# Patient Record
Sex: FEMALE | Race: WHITE | NOT HISPANIC OR LATINO | Employment: STUDENT | URBAN - METROPOLITAN AREA
[De-identification: names, ages, dates, MRNs, and addresses within clinical notes are randomized per-mention and may not be internally consistent; named-entity substitution may affect disease eponyms.]

---

## 2024-06-18 ENCOUNTER — HOSPITAL ENCOUNTER (EMERGENCY)
Facility: CLINIC | Age: 24
Discharge: HOME | End: 2024-06-18
Attending: EMERGENCY MEDICINE | Admitting: EMERGENCY MEDICINE
Payer: COMMERCIAL

## 2024-06-18 ENCOUNTER — APPOINTMENT (OUTPATIENT)
Dept: CT IMAGING | Facility: CLINIC | Age: 24
End: 2024-06-18
Attending: EMERGENCY MEDICINE
Payer: COMMERCIAL

## 2024-06-18 VITALS
TEMPERATURE: 97.7 F | WEIGHT: 150 LBS | SYSTOLIC BLOOD PRESSURE: 126 MMHG | HEIGHT: 64 IN | HEART RATE: 89 BPM | BODY MASS INDEX: 25.61 KG/M2 | DIASTOLIC BLOOD PRESSURE: 90 MMHG | RESPIRATION RATE: 16 BRPM | OXYGEN SATURATION: 99 %

## 2024-06-18 DIAGNOSIS — T81.40XA POSTOPERATIVE INFECTION, UNSPECIFIED TYPE, INITIAL ENCOUNTER: ICD-10-CM

## 2024-06-18 DIAGNOSIS — R22.0 JAW SWELLING: ICD-10-CM

## 2024-06-18 LAB
ANION GAP SERPL CALCULATED.3IONS-SCNC: 9 MMOL/L (ref 7–15)
BASOPHILS # BLD AUTO: 0 10E3/UL (ref 0–0.2)
BASOPHILS NFR BLD AUTO: 0 %
BUN SERPL-MCNC: 10.2 MG/DL (ref 6–20)
CALCIUM SERPL-MCNC: 8.4 MG/DL (ref 8.6–10)
CHLORIDE SERPL-SCNC: 105 MMOL/L (ref 98–107)
CREAT SERPL-MCNC: 0.96 MG/DL (ref 0.51–0.95)
CRP SERPL-MCNC: 27.81 MG/L
DEPRECATED HCO3 PLAS-SCNC: 24 MMOL/L (ref 22–29)
EGFRCR SERPLBLD CKD-EPI 2021: 86 ML/MIN/1.73M2
EOSINOPHIL # BLD AUTO: 0.1 10E3/UL (ref 0–0.7)
EOSINOPHIL NFR BLD AUTO: 1 %
ERYTHROCYTE [DISTWIDTH] IN BLOOD BY AUTOMATED COUNT: 13.1 % (ref 10–15)
GLUCOSE SERPL-MCNC: 159 MG/DL (ref 70–99)
HCG SERPL QL: NEGATIVE
HCT VFR BLD AUTO: 36.3 % (ref 35–47)
HGB BLD-MCNC: 11.9 G/DL (ref 11.7–15.7)
IMM GRANULOCYTES # BLD: 0.1 10E3/UL
IMM GRANULOCYTES NFR BLD: 1 %
LACTATE SERPL-SCNC: 1.1 MMOL/L (ref 0.7–2)
LYMPHOCYTES # BLD AUTO: 2.3 10E3/UL (ref 0.8–5.3)
LYMPHOCYTES NFR BLD AUTO: 12 %
MCH RBC QN AUTO: 28.1 PG (ref 26.5–33)
MCHC RBC AUTO-ENTMCNC: 32.8 G/DL (ref 31.5–36.5)
MCV RBC AUTO: 86 FL (ref 78–100)
MONOCYTES # BLD AUTO: 1.3 10E3/UL (ref 0–1.3)
MONOCYTES NFR BLD AUTO: 7 %
NEUTROPHILS # BLD AUTO: 15 10E3/UL (ref 1.6–8.3)
NEUTROPHILS NFR BLD AUTO: 79 %
NRBC # BLD AUTO: 0 10E3/UL
NRBC BLD AUTO-RTO: 0 /100
PLATELET # BLD AUTO: 355 10E3/UL (ref 150–450)
POTASSIUM SERPL-SCNC: 3.9 MMOL/L (ref 3.4–5.3)
RBC # BLD AUTO: 4.24 10E6/UL (ref 3.8–5.2)
SODIUM SERPL-SCNC: 138 MMOL/L (ref 135–145)
WBC # BLD AUTO: 18.8 10E3/UL (ref 4–11)

## 2024-06-18 PROCEDURE — 36415 COLL VENOUS BLD VENIPUNCTURE: CPT | Performed by: EMERGENCY MEDICINE

## 2024-06-18 PROCEDURE — 86140 C-REACTIVE PROTEIN: CPT | Performed by: EMERGENCY MEDICINE

## 2024-06-18 PROCEDURE — 87040 BLOOD CULTURE FOR BACTERIA: CPT | Performed by: EMERGENCY MEDICINE

## 2024-06-18 PROCEDURE — 80048 BASIC METABOLIC PNL TOTAL CA: CPT | Performed by: EMERGENCY MEDICINE

## 2024-06-18 PROCEDURE — 96361 HYDRATE IV INFUSION ADD-ON: CPT | Performed by: EMERGENCY MEDICINE

## 2024-06-18 PROCEDURE — 41800 DRAINAGE OF GUM LESION: CPT | Performed by: EMERGENCY MEDICINE

## 2024-06-18 PROCEDURE — 96376 TX/PRO/DX INJ SAME DRUG ADON: CPT | Performed by: EMERGENCY MEDICINE

## 2024-06-18 PROCEDURE — 99284 EMERGENCY DEPT VISIT MOD MDM: CPT | Performed by: EMERGENCY MEDICINE

## 2024-06-18 PROCEDURE — 70491 CT SOFT TISSUE NECK W/DYE: CPT

## 2024-06-18 PROCEDURE — 250N000009 HC RX 250: Performed by: EMERGENCY MEDICINE

## 2024-06-18 PROCEDURE — 258N000003 HC RX IP 258 OP 636: Mod: JZ | Performed by: EMERGENCY MEDICINE

## 2024-06-18 PROCEDURE — 250N000011 HC RX IP 250 OP 636: Performed by: EMERGENCY MEDICINE

## 2024-06-18 PROCEDURE — 83605 ASSAY OF LACTIC ACID: CPT | Performed by: EMERGENCY MEDICINE

## 2024-06-18 PROCEDURE — 84703 CHORIONIC GONADOTROPIN ASSAY: CPT | Performed by: EMERGENCY MEDICINE

## 2024-06-18 PROCEDURE — 85025 COMPLETE CBC W/AUTO DIFF WBC: CPT | Performed by: EMERGENCY MEDICINE

## 2024-06-18 PROCEDURE — 99285 EMERGENCY DEPT VISIT HI MDM: CPT | Mod: 25 | Performed by: EMERGENCY MEDICINE

## 2024-06-18 PROCEDURE — 96365 THER/PROPH/DIAG IV INF INIT: CPT | Performed by: EMERGENCY MEDICINE

## 2024-06-18 PROCEDURE — 96375 TX/PRO/DX INJ NEW DRUG ADDON: CPT | Mod: 59 | Performed by: EMERGENCY MEDICINE

## 2024-06-18 PROCEDURE — 250N000011 HC RX IP 250 OP 636: Mod: JZ | Performed by: EMERGENCY MEDICINE

## 2024-06-18 RX ORDER — CHLORHEXIDINE GLUCONATE ORAL RINSE 1.2 MG/ML
15 SOLUTION DENTAL 2 TIMES DAILY
Qty: 473 ML | Refills: 0 | Status: SHIPPED | OUTPATIENT
Start: 2024-06-18

## 2024-06-18 RX ORDER — IOPAMIDOL 755 MG/ML
100 INJECTION, SOLUTION INTRAVASCULAR ONCE
Status: COMPLETED | OUTPATIENT
Start: 2024-06-18 | End: 2024-06-18

## 2024-06-18 RX ORDER — HYDROMORPHONE HYDROCHLORIDE 1 MG/ML
0.5 INJECTION, SOLUTION INTRAMUSCULAR; INTRAVENOUS; SUBCUTANEOUS EVERY 30 MIN PRN
Status: COMPLETED | OUTPATIENT
Start: 2024-06-18 | End: 2024-06-18

## 2024-06-18 RX ORDER — ONDANSETRON 2 MG/ML
4 INJECTION INTRAMUSCULAR; INTRAVENOUS EVERY 30 MIN PRN
Status: DISCONTINUED | OUTPATIENT
Start: 2024-06-18 | End: 2024-06-18 | Stop reason: HOSPADM

## 2024-06-18 RX ORDER — AMOXICILLIN 500 MG/1
500 TABLET, FILM COATED ORAL 3 TIMES DAILY
COMMUNITY

## 2024-06-18 RX ORDER — AMPICILLIN AND SULBACTAM 2; 1 G/1; G/1
3 INJECTION, POWDER, FOR SOLUTION INTRAMUSCULAR; INTRAVENOUS ONCE
Status: COMPLETED | OUTPATIENT
Start: 2024-06-18 | End: 2024-06-18

## 2024-06-18 RX ORDER — OXYCODONE HYDROCHLORIDE 5 MG/1
5 TABLET ORAL EVERY 4 HOURS PRN
Qty: 18 TABLET | Refills: 0 | Status: SHIPPED | OUTPATIENT
Start: 2024-06-18 | End: 2024-06-21

## 2024-06-18 RX ADMIN — AMPICILLIN SODIUM AND SULBACTAM SODIUM 3 G: 2; 1 INJECTION, POWDER, FOR SOLUTION INTRAMUSCULAR; INTRAVENOUS at 03:14

## 2024-06-18 RX ADMIN — SODIUM CHLORIDE 50 ML: 9 INJECTION, SOLUTION INTRAVENOUS at 03:45

## 2024-06-18 RX ADMIN — SODIUM CHLORIDE 2000 ML: 9 INJECTION, SOLUTION INTRAVENOUS at 03:07

## 2024-06-18 RX ADMIN — HYDROMORPHONE HYDROCHLORIDE 0.5 MG: 1 INJECTION, SOLUTION INTRAMUSCULAR; INTRAVENOUS; SUBCUTANEOUS at 04:09

## 2024-06-18 RX ADMIN — HYDROMORPHONE HYDROCHLORIDE 0.5 MG: 1 INJECTION, SOLUTION INTRAMUSCULAR; INTRAVENOUS; SUBCUTANEOUS at 07:43

## 2024-06-18 RX ADMIN — HYDROMORPHONE HYDROCHLORIDE 0.5 MG: 1 INJECTION, SOLUTION INTRAMUSCULAR; INTRAVENOUS; SUBCUTANEOUS at 05:54

## 2024-06-18 RX ADMIN — IOPAMIDOL 90 ML: 755 INJECTION, SOLUTION INTRAVENOUS at 03:45

## 2024-06-18 ASSESSMENT — ACTIVITIES OF DAILY LIVING (ADL)
ADLS_ACUITY_SCORE: 35
ADLS_ACUITY_SCORE: 35
ADLS_ACUITY_SCORE: 33
ADLS_ACUITY_SCORE: 35

## 2024-06-18 ASSESSMENT — COLUMBIA-SUICIDE SEVERITY RATING SCALE - C-SSRS
6. HAVE YOU EVER DONE ANYTHING, STARTED TO DO ANYTHING, OR PREPARED TO DO ANYTHING TO END YOUR LIFE?: NO
1. IN THE PAST MONTH, HAVE YOU WISHED YOU WERE DEAD OR WISHED YOU COULD GO TO SLEEP AND NOT WAKE UP?: NO
2. HAVE YOU ACTUALLY HAD ANY THOUGHTS OF KILLING YOURSELF IN THE PAST MONTH?: NO

## 2024-06-18 NOTE — PROCEDURES
Oral & Maxillofacial Surgery Operative Note:      Procedure:   Incision and drainage of the right mandible      Pre-Operative Diagnosis:   Right subperiosteal abscess at the right mandible s/p ext #32      Post-Operative Diagnosis: Same      RESIDENT SURGEON: Deysi Hood DDS  ASSISTANT: bedside nurse      PERIOPERATIVE ANTIBIOTICS: Unasyn 3g      ESTIMATED BLOOD LOSS: less than 5 ml      LOCAL ANESTHESIA: 5.1 ml total of 2% lidocaine with 1:100,000 epinephrine and 3.4 cc total of 0.5% bupivacaine with 1:200,000 epinephrine delivered via bilateral REJI, long buccal nerve blocks and local infiltration      SPECIMENS: None.     COMPLICATIONS: None     DRAINS: One penrose drain placed in right mandible in mouth       DESCRIPTION OF PROCEDURE: A 15 blade was used to make a sulcular incision extending from tooth #28,29,30,31 and a distal release distal to tooth #31. A curved hemostat was then utilized to bluntly dissect down to abscess in the periosteal and buccal space. Granulation tissue was then removed at the extraction socket. Bloody purulence was noted, about less than 1 ml. A 1/4 inch penrose drain was introduced to the right mandible in mouth and sutured in place to with silk suture 3-0. The surgical site was irrigated one more time with copious saline. Patient tolerated well with no complications. Dilaudid was given prior to the procedure for pain relief.     Complications: None.      Deysi Hood DDS  FS intern

## 2024-06-18 NOTE — ED PROVIDER NOTES
"     Emergency Department Patient Sign-out       Brief HPI:  This is a 23 year old female signed out to me by Dr. Mendez .  See initial ED Provider note for details of the presentation.            Significant Events prior to my assuming care: A 23-year-old female who presented on the earlier shift with right lower jaw pain and swelling following wisdom tooth extraction nearly a month ago.  Initial blood pressure somewhat tenuous however this improved with IV fluids.  Was diagnosed with dental abscess and cellulitis.  Seen in consultation by OMFS who are currently in the ED performing an incision and drainage procedure.  Assuming the procedure goes well and the patient remains hemodynamically stable plan at signout was to discharge for outpatient follow-up.      Exam:   Patient Vitals for the past 24 hrs:   BP Temp Temp src Pulse Resp SpO2 Height Weight   06/18/24 0550 124/83 -- -- -- -- 98 % -- --   06/18/24 0530 103/80 -- -- 90 -- 98 % -- --   06/18/24 0500 106/74 -- -- 73 -- 99 % -- --   06/18/24 0330 106/70 -- -- 80 -- 100 % -- --   06/18/24 0315 99/64 -- -- 82 -- -- -- --   06/18/24 0300 105/63 -- -- 85 -- -- -- --   06/18/24 0256 (!) 89/59 -- -- 84 -- -- -- --   06/18/24 0252 (!) 75/48 97.7  F (36.5  C) Oral 78 16 99 % 1.626 m (5' 4\") 68 kg (150 lb)       EXAM:  HEENT: Normal.  Oropharynx clear and moist.  Neck: Supple, trachea midline, normal voice  Chest:  No respiratory distress, speaks in complete sentences, chest wall nontender, lungs clear in all fields  CV: Regular rate and rhythm, no murmur, normal pulse, no jugular venous distention  Abdomen: Nondistended, soft nontender.  No hepatosplenomegaly.  Extremities: No edema or tenderness        ED RESULTS:   Results for orders placed or performed during the hospital encounter of 06/18/24 (from the past 24 hour(s))   CBC with platelets differential     Status: Abnormal    Collection Time: 06/18/24  3:04 AM    Narrative    The following orders were created " for panel order CBC with platelets differential.  Procedure                               Abnormality         Status                     ---------                               -----------         ------                     CBC with platelets and d...[[id number]]  Abnormal            Final result                 Please view results for these tests on the individual orders.   Basic metabolic panel     Status: Abnormal    Collection Time: 06/18/24  3:04 AM   Result Value Ref Range    Sodium 138 135 - 145 mmol/L    Potassium 3.9 3.4 - 5.3 mmol/L    Chloride 105 98 - 107 mmol/L    Carbon Dioxide (CO2) 24 22 - 29 mmol/L    Anion Gap 9 7 - 15 mmol/L    Urea Nitrogen 10.2 6.0 - 20.0 mg/dL    Creatinine 0.96 (H) 0.51 - 0.95 mg/dL    GFR Estimate 86 >60 mL/min/1.73m2    Calcium 8.4 (L) 8.6 - 10.0 mg/dL    Glucose 159 (H) 70 - 99 mg/dL   Lactic acid whole blood with 1x repeat in 2 hr when >2     Status: Normal    Collection Time: 06/18/24  3:04 AM   Result Value Ref Range    Lactic Acid, Initial 1.1 0.7 - 2.0 mmol/L   HCG qualitative Blood     Status: Normal    Collection Time: 06/18/24  3:04 AM   Result Value Ref Range    hCG Serum Qualitative Negative Negative   CBC with platelets and differential     Status: Abnormal    Collection Time: 06/18/24  3:04 AM   Result Value Ref Range    WBC Count 18.8 (H) 4.0 - 11.0 10e3/uL    RBC Count 4.24 3.80 - 5.20 10e6/uL    Hemoglobin 11.9 11.7 - 15.7 g/dL    Hematocrit 36.3 35.0 - 47.0 %    MCV 86 78 - 100 fL    MCH 28.1 26.5 - 33.0 pg    MCHC 32.8 31.5 - 36.5 g/dL    RDW 13.1 10.0 - 15.0 %    Platelet Count 355 150 - 450 10e3/uL    % Neutrophils 79 %    % Lymphocytes 12 %    % Monocytes 7 %    % Eosinophils 1 %    % Basophils 0 %    % Immature Granulocytes 1 %    NRBCs per 100 WBC 0 <1 /100    Absolute Neutrophils 15.0 (H) 1.6 - 8.3 10e3/uL    Absolute Lymphocytes 2.3 0.8 - 5.3 10e3/uL    Absolute Monocytes 1.3 0.0 - 1.3 10e3/uL    Absolute Eosinophils 0.1 0.0 - 0.7 10e3/uL    Absolute  Basophils 0.0 0.0 - 0.2 10e3/uL    Absolute Immature Granulocytes 0.1 <=0.4 10e3/uL    Absolute NRBCs 0.0 10e3/uL   CRP inflammation     Status: Abnormal    Collection Time: 06/18/24  3:04 AM   Result Value Ref Range    CRP Inflammation 27.81 (H) <5.00 mg/L   CT Soft Tissue Neck w Contrast     Status: None    Collection Time: 06/18/24  3:52 AM    Narrative    EXAM: CT SOFT TISSUE NECK W CONTRAST  LOCATION: [facility]  DATE: 6/18/2024    INDICATION: Right lower jaw swelling and pain wisdom teeth removed a month ago  COMPARISON: None.  CONTRAST: 90 mL Isovue 370  TECHNIQUE: Routine CT Soft Tissue Neck with IV contrast. Multiplanar reformats. Dose reduction techniques were used.    FINDINGS:   Previous removal of the maxillary and mandibular wisdom teeth. There is moderate soft tissue stranding adjacent to the right mandibular wisdom tooth extraction site. Small associated phlegmon along the outer margin of the right mandible measuring 9 x 6   mm. Suggestion of mild enlargement and stranding involving the masseter muscle. Stranding extends into the right upper neck and submandibular space. No other abscess. The remainder of the extraction sockets/sites appear normal.    MUCOSAL SPACES/SOFT TISSUES: Normal mucosal spaces of the upper aerodigestive tract. Normal vocal cords and infraglottic trachea.    LYMPH NODES: A few scattered mildly prominent, likely reactive upper cervical nodes.     SALIVARY GLANDS: Normal parotid and submandibular glands.    THYROID: Normal.     VESSELS: Vascular structures of the neck are patent.    VISUALIZED INTRACRANIAL/ORBITS/SINUSES: No abnormality of the visualized intracranial compartment or orbits. Visualized paranasal sinuses and mastoid air cells are clear.    OTHER: No destructive osseous lesion. The included lung apices are clear.      Impression    IMPRESSION:   1.  Recent maxillary and mandibular wisdom tooth extraction. Significant  soft tissue stranding adjacent to the right mandibular extraction site with small presumed subperiosteal phlegmon. Possible myositis involving the right masseter. Remainder of the   extraction sites appear normal.    2.  Mild, likely reactive cervical adenopathy.                 ED MEDICATIONS:   Medications   ondansetron (ZOFRAN) injection 4 mg (has no administration in time range)   HYDROmorphone (PF) (DILAUDID) injection 0.5 mg (0.5 mg Intravenous $Given 6/18/24 7284)   sodium chloride 0.9% BOLUS 2,000 mL (0 mLs Intravenous Stopped 6/18/24 0415)   ampicillin-sulbactam (UNASYN) 3 g vial to attach to  mL bag (0 g Intravenous Stopped 6/18/24 5122)   iopamidol (ISOVUE-370) solution 100 mL (90 mLs Intravenous $Given 6/18/24 7715)   sodium chloride 0.9 % bag 500 mL for CT scan flush use (50 mLs Intravenous $Given 6/18/24 3785)         Impression:    ICD-10-CM    1. Postoperative infection, unspecified type, initial encounter  T81.40XA       2. Jaw swelling  R22.0           Plan:    Pending studies include none.  Patient is being seen by OMFS.    Incision and drainage performed and drain left in place.  Prescriptions are on the patient's chart for discharge.  She will follow-up with OMFS Thursday.  We discussed the indications for emergency department return and follow-up.  Stable for discharge.      MD Anyi Quintero David, MD  06/18/24 7513

## 2024-06-18 NOTE — CONSULTS
"  ORAL & MAXILLOFACIAL SURGERY (OMFS)   CONSULT    Patient: Richelle Motley  : 2000  MRN: [id number]  Date of Admission: 2024  Requesting Provider: Casimiro De Santiago    [facility] consulted regarding facial edema.      Assessment   23 year old female otherwise healthy presents to the ED [facility] with right subperiosteal abscess s/p extraction #32 on 2024       Plan   - Bedside I&D with placement of one penrose drain in mouth   - Okay to discharge after the I&D from the OMFS perspective   - Augmentin 875/125 BID x 7 days upon discharge   - Peridex BID   - Tylenol, ibuprofen and oxycodone for pain   - Heat compression to face, avoid ice   - Head of bed elevated   - Follow-up on 2024 as outpatient at the OMFS clinic. Our clinic staff will call to schedule     Oral and Maxillofacial Surgery (OMFS) Clinic  [facility]  [facility] - 7th floor  [address] [place] [id number]  Clinic phone number: [phone]  Clinic fax number: [phone]    Thank you for this consult. Please contact the OMFS resident on-call with questions or concerns.    Discussed with Upper Resident, Dr. Estefany Everett, who discussed with Staff. Dr. Richelle Woodward.    Deysi Hood DDS   Oral & Maxillofacial Surgery, Intern  Pager: [phone]    ___________________________________________________________________        Chief complaint   \"I had wisdom tooth removal in [place] and then the right side got infect\"       History of present illness   23 year old female with no PMH presents with a 2-day history of right facial swelling and jaw pain. Patient got her wisdom teeth extracted a month ago on  at an oral surgeon office in [place] where her parents live. 2 weeks ago she started to develop signs of infection including purulence and edema, she was seen at the [facility] dental clinic and was advised that it was a dry socket with infection on top of the dry socket. She was not " given antibiotics at the [facility] dental clinic. She then contacted the oral surgeon in [place] who gave her one course of Amoxicillin. The purulence at site #32 then popped so she thought the infection was taken care of. Last Sunday on 6/16 the swelling started again and has been increasing. She contacted her general dentist in [place] and was given Amoxicillin and set up an appointment with the [facility] dental clinic today at 9:45 am. But with the increasing facial swelling, she decided to come in the [facility] overnight to get evaluated.         Past medical history   History reviewed. No pertinent past medical history.    Past surgical history   History reviewed. No pertinent surgical history.    PTA medications     Current Facility-Administered Medications   Medication Dose Route Frequency Provider Last Rate Last Admin    ondansetron (ZOFRAN) injection 4 mg  4 mg Intravenous Q30 Min PRN Suhail Mendez,          Current Outpatient Medications   Medication Sig Dispense Refill    amoxicillin (AMOXIL) 500 MG tablet Take 500 mg by mouth 3 times daily      amoxicillin-clavulanate (AUGMENTIN) 875-125 MG tablet Take 1 tablet by mouth 2 times daily for 10 days 20 tablet 0    chlorhexidine (PERIDEX) 0.12 % solution Swish and spit 15 mLs in mouth 2 times daily 473 mL 0    oxyCODONE (ROXICODONE) 5 MG tablet Take 1 tablet (5 mg) by mouth every 4 hours as needed for pain 18 tablet 0                  Allergies   No Known Allergies     Family history   History reviewed. No pertinent family history.    Social history     Social History     Socioeconomic History    Marital status: Single     Spouse name: Not on file    Number of children: Not on file    Years of education: Not on file    Highest education level: Not on file   Occupational History    Not on file   Tobacco Use    Smoking status: Never    Smokeless tobacco: Never   Substance and Sexual Activity    Alcohol use: Not on file    Drug use: Yes     Types: Marijuana  "   Sexual activity: Not on file   Other Topics Concern    Not on file   Social History Narrative    Not on file     Social Determinants of Health     Financial Resource Strain: Not on file   Food Insecurity: Not on file   Transportation Needs: Not on file   Physical Activity: Not on file   Stress: Not on file   Social Connections: Not on file   Interpersonal Safety: Not on file   Housing Stability: Not on file       Review of systems   10 point ROS reviewed and negative aside from listed in HPI       Objective/Physical examination   Vitals: Blood pressure (!) 133/95, pulse 89, temperature 97.7  F (36.5  C), temperature source Oral, resp. rate 16, height 1.626 m (5' 4\"), weight 68 kg (150 lb), SpO2 96%.    GEN: WD/WN female, NAD  HEENT: AT, EOMI, PERRL, mild right facial edema at the right mandible with induration no fluctuation, tender on palpation, the inferior border of the right mandible is palpable, skin is not erythematous, no active purulence   I/O: NICOLE 25 mm (2 fingers), vestibular edema at the right mandible site #32  CV: warm and well-perfused  PULM: breathing comfortably on room air  GI: Soft, ND/NT  MSK: MCCALL, no peripheral extremity edema  NEURO: AAOx4, CN II-XII intact bilaterally  PSYCH: Appropriate mood and affect     Laboratory, pathology and radiology data     Lab results:   CBC RESULTS:   Recent Labs   Lab Test 06/18/24  0304   WBC 18.8*   RBC 4.24   HGB 11.9   HCT 36.3   MCV 86   MCH 28.1   MCHC 32.8   RDW 13.1          Last Basic Metabolic Panel:  Lab Results   Component Value Date     06/18/2024      Lab Results   Component Value Date    POTASSIUM 3.9 06/18/2024     Lab Results   Component Value Date    CHLORIDE 105 06/18/2024     Lab Results   Component Value Date    CHUN 8.4 06/18/2024     Lab Results   Component Value Date    CO2 24 06/18/2024     Lab Results   Component Value Date    BUN 10.2 06/18/2024     Lab Results   Component Value Date    CR 0.96 06/18/2024     Lab Results "   Component Value Date     06/18/2024       IMAGING RESULTS (Include outside hospital results)     Independently reviewed - right sided phlegmon associated with site #32 with fresh extraction socket from recent extraction     Recent Results (from the past 48 hour(s))   CT Soft Tissue Neck w Contrast    Narrative    EXAM: CT SOFT TISSUE NECK W CONTRAST  LOCATION: [facility]  DATE: 6/18/2024    INDICATION: Right lower jaw swelling and pain wisdom teeth removed a month ago  COMPARISON: None.  CONTRAST: 90 mL Isovue 370  TECHNIQUE: Routine CT Soft Tissue Neck with IV contrast. Multiplanar reformats. Dose reduction techniques were used.    FINDINGS:   Previous removal of the maxillary and mandibular wisdom teeth. There is moderate soft tissue stranding adjacent to the right mandibular wisdom tooth extraction site. Small associated phlegmon along the outer margin of the right mandible measuring 9 x 6   mm. Suggestion of mild enlargement and stranding involving the masseter muscle. Stranding extends into the right upper neck and submandibular space. No other abscess. The remainder of the extraction sockets/sites appear normal.    MUCOSAL SPACES/SOFT TISSUES: Normal mucosal spaces of the upper aerodigestive tract. Normal vocal cords and infraglottic trachea.    LYMPH NODES: A few scattered mildly prominent, likely reactive upper cervical nodes.     SALIVARY GLANDS: Normal parotid and submandibular glands.    THYROID: Normal.     VESSELS: Vascular structures of the neck are patent.    VISUALIZED INTRACRANIAL/ORBITS/SINUSES: No abnormality of the visualized intracranial compartment or orbits. Visualized paranasal sinuses and mastoid air cells are clear.    OTHER: No destructive osseous lesion. The included lung apices are clear.      Impression    IMPRESSION:   1.  Recent maxillary and mandibular wisdom tooth extraction. Significant soft tissue stranding adjacent to the  right mandibular extraction site with small presumed subperiosteal phlegmon. Possible myositis involving the right masseter. Remainder of the   extraction sites appear normal.    2.  Mild, likely reactive cervical adenopathy.

## 2024-06-18 NOTE — LETTER
June 18, 2024      To Whom It May Concern:      Richelle Motley was seen in our Emergency Department today, 06/18/24.  I expect her condition to improve over the next 3 days.  She may return to work/school when improved.    Sincerely,        Casmiiro De Santiago MD

## 2024-06-18 NOTE — ED PROVIDER NOTES
"ED Provider Note  [facility]      History     Chief Complaint   Patient presents with    Dental Problem     Pt had wisdom teeth removed a month ago, R side of face swollen 10/10 pain started Sunday night. Currently taking amoxicillin 500 mg.     HPI  Richelle Motley is a 23 year old female who presents to us with chief complaint of right lower jaw pain and swelling.  She had her wisdom teeth out approximate month ago.  She had swelling and pain after that was placed on amoxicillin.  She is suspicious that amoxicillin pain and swelling have returned.  Patient is feeling dizzy.  Nausea and severe pain present.  No fevers.  No chest pain or shortness of breath.  Patient denies likelihood of pregnancy.    Past Medical History  History reviewed. No pertinent past medical history.  History reviewed. No pertinent surgical history.  amoxicillin (AMOXIL) 500 MG tablet  amoxicillin-clavulanate (AUGMENTIN) 875-125 MG tablet  chlorhexidine (PERIDEX) 0.12 % solution  oxyCODONE (ROXICODONE) 5 MG tablet      No Known Allergies  Family History  History reviewed. No pertinent family history.  Social History   Social History     Tobacco Use    Smoking status: Never    Smokeless tobacco: Never   Substance Use Topics    Drug use: Yes     Types: Marijuana      A medically appropriate review of systems was performed with pertinent positives and negatives noted in the HPI, and all other systems negative.    Physical Exam   BP: (!) 75/48  Pulse: 78  Temp: 97.7  F (36.5  C)  Resp: 16  Height: 162.6 cm (5' 4\")  Weight: 68 kg (150 lb)  SpO2: 99 %  Physical Exam  Vitals and nursing note reviewed.   Constitutional:       General: She is not in acute distress.     Appearance: She is well-developed. She is not ill-appearing.   HENT:      Head: Normocephalic and atraumatic.      Right Ear: External ear normal.      Left Ear: External ear normal.      Nose: Nose normal.      Mouth/Throat:      Comments: Swelling " in the area of the right lower jaw with tenderness   Eyes:      Extraocular Movements: Extraocular movements intact.      Conjunctiva/sclera: Conjunctivae normal.   Pulmonary:      Effort: Pulmonary effort is normal. No respiratory distress.   Abdominal:      General: There is no distension.   Musculoskeletal:         General: No swelling or deformity.      Cervical back: Normal range of motion and neck supple.   Skin:     General: Skin is warm and dry.   Neurological:      Mental Status: Mental status is at baseline.      Comments: Alert, oriented   Psychiatric:         Mood and Affect: Mood normal.         Behavior: Behavior normal.           ED Course, Procedures, & Data      Procedures            Results for orders placed or performed during the hospital encounter of 06/18/24   CT Soft Tissue Neck w Contrast     Status: None    Narrative    EXAM: CT SOFT TISSUE NECK W CONTRAST  LOCATION: [facility]  DATE: 6/18/2024    INDICATION: Right lower jaw swelling and pain wisdom teeth removed a month ago  COMPARISON: None.  CONTRAST: 90 mL Isovue 370  TECHNIQUE: Routine CT Soft Tissue Neck with IV contrast. Multiplanar reformats. Dose reduction techniques were used.    FINDINGS:   Previous removal of the maxillary and mandibular wisdom teeth. There is moderate soft tissue stranding adjacent to the right mandibular wisdom tooth extraction site. Small associated phlegmon along the outer margin of the right mandible measuring 9 x 6   mm. Suggestion of mild enlargement and stranding involving the masseter muscle. Stranding extends into the right upper neck and submandibular space. No other abscess. The remainder of the extraction sockets/sites appear normal.    MUCOSAL SPACES/SOFT TISSUES: Normal mucosal spaces of the upper aerodigestive tract. Normal vocal cords and infraglottic trachea.    LYMPH NODES: A few scattered mildly prominent, likely reactive upper cervical nodes.      SALIVARY GLANDS: Normal parotid and submandibular glands.    THYROID: Normal.     VESSELS: Vascular structures of the neck are patent.    VISUALIZED INTRACRANIAL/ORBITS/SINUSES: No abnormality of the visualized intracranial compartment or orbits. Visualized paranasal sinuses and mastoid air cells are clear.    OTHER: No destructive osseous lesion. The included lung apices are clear.      Impression    IMPRESSION:   1.  Recent maxillary and mandibular wisdom tooth extraction. Significant soft tissue stranding adjacent to the right mandibular extraction site with small presumed subperiosteal phlegmon. Possible myositis involving the right masseter. Remainder of the   extraction sites appear normal.    2.  Mild, likely reactive cervical adenopathy.             Basic metabolic panel     Status: Abnormal   Result Value Ref Range    Sodium 138 135 - 145 mmol/L    Potassium 3.9 3.4 - 5.3 mmol/L    Chloride 105 98 - 107 mmol/L    Carbon Dioxide (CO2) 24 22 - 29 mmol/L    Anion Gap 9 7 - 15 mmol/L    Urea Nitrogen 10.2 6.0 - 20.0 mg/dL    Creatinine 0.96 (H) 0.51 - 0.95 mg/dL    GFR Estimate 86 >60 mL/min/1.73m2    Calcium 8.4 (L) 8.6 - 10.0 mg/dL    Glucose 159 (H) 70 - 99 mg/dL   Lactic acid whole blood with 1x repeat in 2 hr when >2     Status: Normal   Result Value Ref Range    Lactic Acid, Initial 1.1 0.7 - 2.0 mmol/L   HCG qualitative Blood     Status: Normal   Result Value Ref Range    hCG Serum Qualitative Negative Negative   CBC with platelets and differential     Status: Abnormal   Result Value Ref Range    WBC Count 18.8 (H) 4.0 - 11.0 10e3/uL    RBC Count 4.24 3.80 - 5.20 10e6/uL    Hemoglobin 11.9 11.7 - 15.7 g/dL    Hematocrit 36.3 35.0 - 47.0 %    MCV 86 78 - 100 fL    MCH 28.1 26.5 - 33.0 pg    MCHC 32.8 31.5 - 36.5 g/dL    RDW 13.1 10.0 - 15.0 %    Platelet Count 355 150 - 450 10e3/uL    % Neutrophils 79 %    % Lymphocytes 12 %    % Monocytes 7 %    % Eosinophils 1 %    % Basophils 0 %    % Immature  Granulocytes 1 %    NRBCs per 100 WBC 0 <1 /100    Absolute Neutrophils 15.0 (H) 1.6 - 8.3 10e3/uL    Absolute Lymphocytes 2.3 0.8 - 5.3 10e3/uL    Absolute Monocytes 1.3 0.0 - 1.3 10e3/uL    Absolute Eosinophils 0.1 0.0 - 0.7 10e3/uL    Absolute Basophils 0.0 0.0 - 0.2 10e3/uL    Absolute Immature Granulocytes 0.1 <=0.4 10e3/uL    Absolute NRBCs 0.0 10e3/uL   CBC with platelets differential     Status: Abnormal    Narrative    The following orders were created for panel order CBC with platelets differential.  Procedure                               Abnormality         Status                     ---------                               -----------         ------                     CBC with platelets and d...[[id number]]  Abnormal            Final result                 Please view results for these tests on the individual orders.     Medications   ondansetron (ZOFRAN) injection 4 mg (has no administration in time range)   HYDROmorphone (PF) (DILAUDID) injection 0.5 mg (0.5 mg Intravenous $Given 6/18/24 0554)   sodium chloride 0.9% BOLUS 2,000 mL (0 mLs Intravenous Stopped 6/18/24 0415)   ampicillin-sulbactam (UNASYN) 3 g vial to attach to  mL bag (0 g Intravenous Stopped 6/18/24 0412)   iopamidol (ISOVUE-370) solution 100 mL (90 mLs Intravenous $Given 6/18/24 0345)   sodium chloride 0.9 % bag 500 mL for CT scan flush use (50 mLs Intravenous $Given 6/18/24 0345)     Labs Ordered and Resulted from Time of ED Arrival to Time of ED Departure   BASIC METABOLIC PANEL - Abnormal       Result Value    Sodium 138      Potassium 3.9      Chloride 105      Carbon Dioxide (CO2) 24      Anion Gap 9      Urea Nitrogen 10.2      Creatinine 0.96 (*)     GFR Estimate 86      Calcium 8.4 (*)     Glucose 159 (*)    CBC WITH PLATELETS AND DIFFERENTIAL - Abnormal    WBC Count 18.8 (*)     RBC Count 4.24      Hemoglobin 11.9      Hematocrit 36.3      MCV 86      MCH 28.1      MCHC 32.8      RDW 13.1      Platelet Count 355      %  Neutrophils 79      % Lymphocytes 12      % Monocytes 7      % Eosinophils 1      % Basophils 0      % Immature Granulocytes 1      NRBCs per 100 WBC 0      Absolute Neutrophils 15.0 (*)     Absolute Lymphocytes 2.3      Absolute Monocytes 1.3      Absolute Eosinophils 0.1      Absolute Basophils 0.0      Absolute Immature Granulocytes 0.1      Absolute NRBCs 0.0     LACTIC ACID WHOLE BLOOD WITH 1X REPEAT IN 2 HR WHEN >2 - Normal    Lactic Acid, Initial 1.1     HCG QUALITATIVE PREGNANCY - Normal    hCG Serum Qualitative Negative     CRP INFLAMMATION   BLOOD CULTURE   BLOOD CULTURE     CT Soft Tissue Neck w Contrast   Final Result   IMPRESSION:    1.  Recent maxillary and mandibular wisdom tooth extraction. Significant soft tissue stranding adjacent to the right mandibular extraction site with small presumed subperiosteal phlegmon. Possible myositis involving the right masseter. Remainder of the    extraction sites appear normal.      2.  Mild, likely reactive cervical adenopathy.                     2023 Emergency Medicine Coding Guide from [employer]  on 6/18/2024  ** All calculations should be rechecked by clinician prior to use **    RESULT SUMMARY:  5 Estimated Level of Service  Problems: High (5)    Risk: High (5)    Data: Extensive (5)    NARRATIVE MDM:  This patient's problem complexity is High as patient: may have an acute or chronic illness/injury posing a threat to life or body function.   This patient's risk is High due to: overall presentation requiring evaluation for a potentially High-risk process.  This patient's data complexity is Extensive due to:   -multiple tests ordered  -independent interpretation of imaging or EKG        INPUTS:  Number and Complexity --> 2 = 5: illness/injury w/life or body threat (b)  Risk level --> 4 = High  Tests ordered --> 3 = ?3  Tests results reviewed (excluding labs) --> 1 = 1  Prior external notes reviewed --> 0 = 0  Assessment requiring and independent historian -->  0 = No  Independent interpretation of tests --> 1 = Yes  Discussed management/test interpretation w/external professional --> 1 = yes             Assessment & Plan    23-year-old female presents to us with chief complaint of jaw swelling.  Patient was initially hypotensive.  She stated the pain had been making her dizzy.  She was given fluids Zofran and pain medication and started on Unasyn for presumed facial cellulitis or abscess.  Labs show marked leukocytosis of 18.8.  Lactic acid is 1.1.  Remainder of labs are reassuring.  CT scan of the neck shows evidence of infection at the site of the right lower jaw with phlegmon formation.  Discussed care with OMFS.  They evaluate the patient and performed a bedside I&D.  Plan is to discharge her with oxycodone, Augmentin, Peridex and she will follow-up later this week in clinic.  They will call her to set up an appointment.  I have reviewed the nursing notes. I have reviewed the findings, diagnosis, plan and need for follow up with the patient.    New Prescriptions    AMOXICILLIN-CLAVULANATE (AUGMENTIN) 875-125 MG TABLET    Take 1 tablet by mouth 2 times daily for 10 days    CHLORHEXIDINE (PERIDEX) 0.12 % SOLUTION    Swish and spit 15 mLs in mouth 2 times daily    OXYCODONE (ROXICODONE) 5 MG TABLET    Take 1 tablet (5 mg) by mouth every 4 hours as needed for pain       Final diagnoses:   Postoperative infection, unspecified type, initial encounter   Jaw swelling       Suhail Mendez  [facility] EMERGENCY DEPARTMENT  6/18/2024     Suhail Mendez DO  06/18/24 0431

## 2024-06-18 NOTE — DISCHARGE INSTRUCTIONS
Follow-up with the oral maxillofacial surgery clinic.  They will call you to set up the appointment thursday.  Return emergency department as needed.

## 2024-06-23 LAB
BACTERIA BLD CULT: NO GROWTH
BACTERIA BLD CULT: NO GROWTH